# Patient Record
Sex: MALE | Race: WHITE | Employment: FULL TIME | URBAN - METROPOLITAN AREA
[De-identification: names, ages, dates, MRNs, and addresses within clinical notes are randomized per-mention and may not be internally consistent; named-entity substitution may affect disease eponyms.]

---

## 2020-06-14 ENCOUNTER — APPOINTMENT (OUTPATIENT)
Dept: CT IMAGING | Age: 50
End: 2020-06-14
Payer: COMMERCIAL

## 2020-06-14 ENCOUNTER — APPOINTMENT (OUTPATIENT)
Dept: GENERAL RADIOLOGY | Age: 50
End: 2020-06-14
Payer: COMMERCIAL

## 2020-06-14 ENCOUNTER — HOSPITAL ENCOUNTER (EMERGENCY)
Age: 50
Discharge: HOME | End: 2020-06-14
Attending: EMERGENCY MEDICINE
Payer: COMMERCIAL
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
## 2020-06-14 PROBLEM — S02.2XXA NASAL BONE FX-CLOSED: Status: ACTIVE | Noted: 2020-06-14

## 2020-06-14 LAB
ABO/RH: NORMAL
ALLEN TEST: ABNORMAL
ANION GAP SERPL CALCULATED.3IONS-SCNC: 13 MMOL/L (ref 9–17)
ANTIBODY SCREEN: NEGATIVE
ARM BAND NUMBER: NORMAL
BLOOD BANK SPECIMEN: ABNORMAL
BUN BLDV-MCNC: 14 MG/DL (ref 6–20)
CARBOXYHEMOGLOBIN: 0.4 % (ref 0–5)
CHLORIDE BLD-SCNC: 98 MMOL/L (ref 98–107)
CO2: 22 MMOL/L (ref 20–31)
CREAT SERPL-MCNC: 0.87 MG/DL (ref 0.7–1.2)
ETHANOL PERCENT: 0.27 %
ETHANOL: 270 MG/DL
EXPIRATION DATE: NORMAL
FIO2: ABNORMAL
GFR AFRICAN AMERICAN: >60 ML/MIN
GFR NON-AFRICAN AMERICAN: >60 ML/MIN
GFR SERPL CREATININE-BSD FRML MDRD: ABNORMAL ML/MIN/{1.73_M2}
GFR SERPL CREATININE-BSD FRML MDRD: ABNORMAL ML/MIN/{1.73_M2}
GLUCOSE BLD-MCNC: 163 MG/DL (ref 70–99)
HCG QUALITATIVE: ABNORMAL
HCO3 VENOUS: 22.8 MMOL/L (ref 24–30)
HCT VFR BLD CALC: 40.4 % (ref 40.7–50.3)
HEMOGLOBIN: 13.7 G/DL (ref 13–17)
INR BLD: 1
MCH RBC QN AUTO: 31.9 PG (ref 25.2–33.5)
MCHC RBC AUTO-ENTMCNC: 33.9 G/DL (ref 28.4–34.8)
MCV RBC AUTO: 94 FL (ref 82.6–102.9)
METHEMOGLOBIN: ABNORMAL % (ref 0–1.5)
MODE: ABNORMAL
NEGATIVE BASE EXCESS, VEN: 3.8 MMOL/L (ref 0–2)
NOTIFICATION TIME: ABNORMAL
NOTIFICATION: ABNORMAL
NRBC AUTOMATED: 0 PER 100 WBC
O2 DEVICE/FLOW/%: ABNORMAL
O2 SAT, VEN: 68.6 % (ref 60–85)
OXYHEMOGLOBIN: ABNORMAL % (ref 95–98)
PARTIAL THROMBOPLASTIN TIME: 22.8 SEC (ref 20.5–30.5)
PATIENT TEMP: 37
PCO2, VEN, TEMP ADJ: ABNORMAL MMHG (ref 39–55)
PCO2, VEN: 49.9 (ref 39–55)
PDW BLD-RTO: 12.1 % (ref 11.8–14.4)
PEEP/CPAP: ABNORMAL
PH VENOUS: 7.28 (ref 7.32–7.42)
PH, VEN, TEMP ADJ: ABNORMAL (ref 7.32–7.42)
PLATELET # BLD: 368 K/UL (ref 138–453)
PMV BLD AUTO: 9.2 FL (ref 8.1–13.5)
PO2, VEN, TEMP ADJ: ABNORMAL MMHG (ref 30–50)
PO2, VEN: 42.2 (ref 30–50)
POSITIVE BASE EXCESS, VEN: ABNORMAL MMOL/L (ref 0–2)
POTASSIUM SERPL-SCNC: 4.2 MMOL/L (ref 3.7–5.3)
PROTHROMBIN TIME: 10.1 SEC (ref 9–12)
PSV: ABNORMAL
PT. POSITION: ABNORMAL
RBC # BLD: 4.3 M/UL (ref 4.21–5.77)
RESPIRATORY RATE: ABNORMAL
SAMPLE SITE: ABNORMAL
SET RATE: ABNORMAL
SODIUM BLD-SCNC: 133 MMOL/L (ref 135–144)
TEXT FOR RESPIRATORY: ABNORMAL
TOTAL HB: ABNORMAL G/DL (ref 12–16)
TOTAL RATE: ABNORMAL
VT: ABNORMAL
WBC # BLD: 12.6 K/UL (ref 3.5–11.3)

## 2020-06-14 PROCEDURE — 99285 EMERGENCY DEPT VISIT HI MDM: CPT

## 2020-06-14 PROCEDURE — 72128 CT CHEST SPINE W/O DYE: CPT

## 2020-06-14 PROCEDURE — 96374 THER/PROPH/DIAG INJ IV PUSH: CPT

## 2020-06-14 PROCEDURE — 84520 ASSAY OF UREA NITROGEN: CPT

## 2020-06-14 PROCEDURE — 85730 THROMBOPLASTIN TIME PARTIAL: CPT

## 2020-06-14 PROCEDURE — 72131 CT LUMBAR SPINE W/O DYE: CPT

## 2020-06-14 PROCEDURE — G0480 DRUG TEST DEF 1-7 CLASSES: HCPCS

## 2020-06-14 PROCEDURE — 86900 BLOOD TYPING SEROLOGIC ABO: CPT

## 2020-06-14 PROCEDURE — 70450 CT HEAD/BRAIN W/O DYE: CPT

## 2020-06-14 PROCEDURE — 6360000004 HC RX CONTRAST MEDICATION: Performed by: EMERGENCY MEDICINE

## 2020-06-14 PROCEDURE — 6370000000 HC RX 637 (ALT 250 FOR IP): Performed by: EMERGENCY MEDICINE

## 2020-06-14 PROCEDURE — 71045 X-RAY EXAM CHEST 1 VIEW: CPT

## 2020-06-14 PROCEDURE — 80051 ELECTROLYTE PANEL: CPT

## 2020-06-14 PROCEDURE — 82947 ASSAY GLUCOSE BLOOD QUANT: CPT

## 2020-06-14 PROCEDURE — 72125 CT NECK SPINE W/O DYE: CPT

## 2020-06-14 PROCEDURE — 85610 PROTHROMBIN TIME: CPT

## 2020-06-14 PROCEDURE — 82565 ASSAY OF CREATININE: CPT

## 2020-06-14 PROCEDURE — 74177 CT ABD & PELVIS W/CONTRAST: CPT

## 2020-06-14 PROCEDURE — 86850 RBC ANTIBODY SCREEN: CPT

## 2020-06-14 PROCEDURE — 82805 BLOOD GASES W/O2 SATURATION: CPT

## 2020-06-14 PROCEDURE — 84703 CHORIONIC GONADOTROPIN ASSAY: CPT

## 2020-06-14 PROCEDURE — 6360000002 HC RX W HCPCS

## 2020-06-14 PROCEDURE — 86901 BLOOD TYPING SEROLOGIC RH(D): CPT

## 2020-06-14 PROCEDURE — 70486 CT MAXILLOFACIAL W/O DYE: CPT

## 2020-06-14 PROCEDURE — 85027 COMPLETE CBC AUTOMATED: CPT

## 2020-06-14 RX ORDER — ONDANSETRON 4 MG/1
4 TABLET, ORALLY DISINTEGRATING ORAL ONCE
Status: COMPLETED | OUTPATIENT
Start: 2020-06-14 | End: 2020-06-14

## 2020-06-14 RX ORDER — IBUPROFEN 800 MG/1
800 TABLET ORAL EVERY 8 HOURS PRN
Qty: 14 TABLET | Refills: 0 | Status: SHIPPED | OUTPATIENT
Start: 2020-06-14

## 2020-06-14 RX ORDER — AMOXICILLIN AND CLAVULANATE POTASSIUM 875; 125 MG/1; MG/1
1 TABLET, FILM COATED ORAL 2 TIMES DAILY
Qty: 10 TABLET | Refills: 0 | Status: SHIPPED | OUTPATIENT
Start: 2020-06-14 | End: 2020-06-19

## 2020-06-14 RX ORDER — ONDANSETRON 4 MG/1
4 TABLET, ORALLY DISINTEGRATING ORAL EVERY 8 HOURS PRN
Qty: 6 TABLET | Refills: 0 | Status: SHIPPED | OUTPATIENT
Start: 2020-06-14

## 2020-06-14 RX ORDER — ONDANSETRON 2 MG/ML
INJECTION INTRAMUSCULAR; INTRAVENOUS
Status: COMPLETED
Start: 2020-06-14 | End: 2020-06-14

## 2020-06-14 RX ORDER — ONDANSETRON 2 MG/ML
4 INJECTION INTRAMUSCULAR; INTRAVENOUS ONCE
Status: COMPLETED | OUTPATIENT
Start: 2020-06-14 | End: 2020-06-14

## 2020-06-14 RX ADMIN — ONDANSETRON 4 MG: 2 INJECTION, SOLUTION INTRAMUSCULAR; INTRAVENOUS at 06:51

## 2020-06-14 RX ADMIN — ONDANSETRON 4 MG: 2 INJECTION INTRAMUSCULAR; INTRAVENOUS at 06:51

## 2020-06-14 RX ADMIN — IOHEXOL 130 ML: 350 INJECTION, SOLUTION INTRAVENOUS at 04:53

## 2020-06-14 RX ADMIN — ONDANSETRON 4 MG: 4 TABLET, ORALLY DISINTEGRATING ORAL at 07:48

## 2020-06-14 ASSESSMENT — ENCOUNTER SYMPTOMS
VOMITING: 0
COUGH: 0
SHORTNESS OF BREATH: 0
SINUS PAIN: 1
SORE THROAT: 0
NAUSEA: 0
ABDOMINAL PAIN: 0
RHINORRHEA: 0
BACK PAIN: 0

## 2020-06-14 NOTE — ED PROVIDER NOTES
277.  He received tetanus and Ancef. On arrival he is alert and follows commands. Left pupil 1 mm greater than the right. He moves all extremities. Chest and abdomen are benign. Impression is concussive head injury. Plan is CT head and neck. Shaheen Wadsworth.  Christian Allen MD, [facility]  Attending Emergency  Physician                Hank David MD  06/14/20 1526

## 2020-06-14 NOTE — ED PROVIDER NOTES
 Social connections     Talks on phone: Not on file     Gets together: Not on file     Attends Ep[employer] service: Not on file     Active member of club or organization: Not on file     Attends meetings of clubs or organizations: Not on file     Relationship status: Not on file    Intimate partner violence     Fear of current or ex partner: Not on file     Emotionally abused: Not on file     Physically abused: Not on file     Forced sexual activity: Not on file   Other Topics Concern    Not on file   Social History Narrative    Not on file       No family history on file. Allergies:  Patient has no allergy information on record. Home Medications:  Prior to Admission medications    Medication Sig Start Date End Date Taking? Authorizing Provider   ibuprofen (ADVIL;MOTRIN) 800 MG tablet Take 1 tablet by mouth every 8 hours as needed for Pain 6/14/20  Yes Carleenmanigel Simon, DO   ondansetron (ZOFRAN ODT) 4 MG disintegrating tablet Take 1 tablet by mouth every 8 hours as needed for Nausea 6/14/20  Yes Ahmaricarmen Simon, DO   amoxicillin-clavulanate (AUGMENTIN) 875-125 MG per tablet Take 1 tablet by mouth 2 times daily for 5 days 6/14/20 6/19/20 Yes Ahmad Simon, DO       REVIEW OF SYSTEMS    (2-9 systems for level 4, 10 or more for level 5)      Review of Systems   Constitutional: Negative for chills and fever. HENT: Negative for congestion and rhinorrhea. Respiratory: Negative for cough and shortness of breath. Cardiovascular: Negative for chest pain. Gastrointestinal: Negative for abdominal pain, nausea and vomiting. Genitourinary: Negative for decreased urine volume and urgency. Musculoskeletal: Negative for back pain and gait problem. Skin: Positive for wound. Negative for rash. Neurological: Negative for light-headedness and headaches. PHYSICAL EXAM   (up to 7 for level 4, 8 or more for level 5)      INITIAL VITALS:   There were no vitals taken for this visit.     Physical Exam  Vitals signs and nursing note reviewed. Constitutional:       Comments: Alert, oriented, cooperative with exam, no acute distress   HENT:      Head: Normocephalic. Comments: Abrasions to bridge of nose, dried blood in bilateral nares, no septal hematoma     Ears:      Comments: No hemotympanum bilaterally  Eyes:      Extraocular Movements: Extraocular movements intact. Conjunctiva/sclera: Conjunctivae normal.      Pupils: Pupils are equal, round, and reactive to light. Neck:      Musculoskeletal: No muscular tenderness. Comments: Patient in c-collar, no midline tenderness  Cardiovascular:      Rate and Rhythm: Normal rate. Pulmonary:      Effort: Pulmonary effort is normal.      Breath sounds: Normal breath sounds. Comments: Equal breath sounds bilaterally  Abdominal:      General: Abdomen is flat. There is no distension. Musculoskeletal: Normal range of motion. General: No swelling or tenderness. Lymphadenopathy:      Cervical: No cervical adenopathy. Skin:     General: Skin is warm. Capillary Refill: Capillary refill takes less than 2 seconds. Neurological:      General: No focal deficit present. Mental Status: He is oriented to person, place, and time.          DIFFERENTIAL  DIAGNOSIS     PLAN (LABS / IMAGING / EKG):  Orders Placed This Encounter   Procedures    XR CHEST PORTABLE    CT CHEST ABDOMEN PELVIS W CONTRAST    CT Head WO Contrast    CT Cervical Spine WO Contrast    CT Thoracic Spine WO Contrast    CT Lumbar Spine WO Contrast    CT FACIAL BONES WO CONTRAST    Trauma Panel    Urine Drug Screen    Urinalysis    Type and Screen       MEDICATIONS ORDERED:  Orders Placed This Encounter   Medications    ondansetron (ZOFRAN) 4 MG/2ML injection     MARCOS BE: cabinet override    iohexol (OMNIPAQUE 350) solution 130 mL    ondansetron (ZOFRAN-ODT) disintegrating tablet 4 mg    ondansetron (ZOFRAN) injection 4 mg    ibuprofen (ADVIL;MOTRIN) 800 MG 0.4 0 - 5 %    Methemoglobin NOT REPORTED 0.0 - 1.5 %    Pt Temp 37.0     pH, Hipolito, Temp Adj NOT REPORTED 7.320 - 7.420    pCO2, Hipolito, Temp Adj NOT REPORTED 39 - 55 mmHg    pO2, Hipolito, Temp Adj NOT REPORTED 30 - 50 mmHg    O2 Device/Flow/% NOT REPORTED     Respiratory Rate NOT REPORTED     Miko Test NOT REPORTED     Sample Site NOT REPORTED     Pt. Position NOT REPORTED     Mode NOT REPORTED     Set Rate NOT REPORTED     Total Rate NOT REPORTED     VT NOT REPORTED     FIO2 UNKNOWN     Peep/Cpap NOT REPORTED     PSV NOT REPORTED     Text for Respiratory NOT REPORTED     NOTIFICATION NOT REPORTED     NOTIFICATION TIME NOT REPORTED    TYPE AND SCREEN   Result Value Ref Range    Expiration Date 06/17/2020,2359     Arm Band Number [id number]     ABO/Rh O POSITIVE     Antibody Screen NEGATIVE          MDM/EMERGENCY DEPARTMENT COURSE:    51-year-old male presenting as a transfer from outside facility due to fall while intoxicated, was perseverating at outside facility, concern for intracranial pathology, arrives alert and oriented, airway intact, equal breath sounds bilaterally. I was the airway physician, trauma team present on patient's arrival into the emergency department. Patient did not require any airway intervention during emergency department stay. Patient found to have bilateral nasal bone fractures, no other injuries. Will discharge the patient when clinically sober or has a sober ride with sinus precautions. ED Course as of Jun 14 2008   Sun Jun 14, 2020   0630 Daughter coming to  patient in approximately 2 hours    [BJ]   0716 C-collar cleared by trauma. Ok to discharge from trauma standpoint. Patient has a sober ride here (Dad) who has agreed to take responsibility for the patient as he is still intoxicated. Will discharge at this time with sinus precautions and OMF follow up. Patient agrees with plan.  I explained that although the ED work-up performed today is reassuring, it is always possible for things to worsen or change while at home - as such, it is important to return to the ED immediately for re-evaluation if new symptoms develop. Additional verbal and written discharge instructions and return precautions were given. All questions were answered prior to discharge. [BJ]   [id number] Patient is ambulatory with a steady and equal gait. [BJ]      ED Course User Index  [BJ] Tony Lawrence DO         PROCEDURES:  None    CONSULTS:  None    CRITICAL CARE:  None    FINAL IMPRESSION      1.  Acute alcoholic intoxication with complication ([address]    2. Closed fracture of nasal bone, initial encounter          DISPOSITION / PLAN     DISPOSITION Decision To Discharge    PATIENT REFERRED TO:  [address] [facility] [address]  [address] [id number]  [phone]  Schedule an appointment as soon as possible for a visit   to follow up with trauma clinic    Gomze Florentino DDS  [address] [facility] [address] [facility]  [phone]    Schedule an appointment as soon as possible for a visit         DISCHARGE MEDICATIONS:  Discharge Medication List as of 6/14/2020  7:25 AM      START taking these medications    Details   ibuprofen (ADVIL;MOTRIN) 800 MG tablet Take 1 tablet by mouth every 8 hours as needed for Pain, Disp-14 tablet, R-0Print      ondansetron (ZOFRAN ODT) 4 MG disintegrating tablet Take 1 tablet by mouth every 8 hours as needed for Nausea, Disp-6 tablet, R-0Print      amoxicillin-clavulanate (AUGMENTIN) 875-125 MG per tablet Take 1 tablet by mouth 2 times daily for 5 days, Disp-10 tablet, R-0Print             Vandana Rodriguez MD  Emergency Medicine Resident    (Please note that portions of this note were completed with a voicerecognition program.  Efforts were made to edit the dictations but occasionally words are mis-transcribed.)        Vandana Rodriguez MD  06/14/20 Raúl Ross MD  06/14/20 2008

## 2020-06-14 NOTE — FLOWSHEET NOTE
[facility] [address]     Emergency/Trauma Note    PATIENT NAME: Tanya Sapp    Shift date: 6/14/2020  Shift day: Saturday   Shift # 3    Room # 20/20   Name: Tanya Sapp            Age: 54 y.o. Gender: male          [address] of [place]: [place]    Trauma/Incident type: Adult Trauma Priority  Admit Date & Time: 6/14/2020  4:02 AM  TRAUMA NAME:         PATIENT/EVENT DESCRIPTION:  Tanya Sapp is a 54 y.o. male who arrived via [facility] from [facility] as a Trauma Priority. Patient fell down stairs, face down. Patient presents with facial lacerations. CT of head and neck being performed. Pt to be admitted to 20/20. SPIRITUAL ASSESSMENT/INTERVENTION:   responded to page of Trauma Priority.  went to Trauma C.  was ministry of presence.  at bedside. Patient stated he is [place] and attends [employer]. Patient stated his family is at the [facility] in [facility]. Patient very anxious and fearful.  prayed with patient. PATIENT BELONGINGS:  With patient    ANY BELONGINGS OF SIGNIFICANT VALUE NOTED:  None Noted    REGISTRATION STAFF NOTIFIED? WHAT IS YOUR SPIRITUAL CARE PLAN FOR THIS PATIENT?:   Chaplains will remain available for spiritual and emotional support. Electronically signed by [facility] Resident      06/14/20 5579   Encounter Summary   Services provided to: Patient   Referral/Consult From: Multi-disciplinary team   Support System Family members; Spiritism/gavin community   Place of Jehovah's witness   ([place])   Continue Visiting   (6/14/2020)   Complexity of Encounter High   Length of Encounter 1 hour   Spiritual Assessment Completed Yes   Crisis   Type Trauma   Assessment Grieving; Anxious; Fearful   Intervention Active listening;Prayer;Sustaining presence/ Ministry of presence   Outcome Expressed gratitude   , on 6/14/2020 at 5:10 AM.  [address] [facility] [address]  [address] [phone]

## 2020-06-14 NOTE — ED PROVIDER NOTES
reduce the radiation dose to as low as reasonably achievable.; CT of the face was performed without the administration of intravenous contrast. Multiplanar reformatted images are provided for review. Dose modulation, iterative reconstruction, and/or weight based adjustment of the mA/kV was utilized to reduce the radiation dose to as low as reasonably achievable.; CT of the cervical spine was performed without the administration of intravenous contrast. Multiplanar reformatted images are provided for review. Dose modulation, iterative reconstruction, and/or weight based adjustment of the mA/kV was utilized to reduce the radiation dose to as low as reasonably achievable. COMPARISON: None. HISTORY: ORDERING SYSTEM PROVIDED HISTORY: Fall TECHNOLOGIST PROVIDED HISTORY: Fall Reason for Exam: trauma Acuity: Unknown Type of Exam: Unknown Mechanism of Injury: fall; ORDERING SYSTEM PROVIDED HISTORY: fall, nose lac TECHNOLOGIST PROVIDED HISTORY: fall, nose lac Reason for Exam: trauma Acuity: Unknown Type of Exam: Unknown Mechanism of Injury: fall; ORDERING SYSTEM PROVIDED HISTORY: Fall TECHNOLOGIST PROVIDED HISTORY: Fall Reason for Exam: trauma Acuity: Unknown Type of Exam: Unknown Mechanism of Injury: fall FINDINGS: CT CERVICAL SPINE: BONES/ALIGNMENT: There is no acute fracture or traumatic malalignment. DEGENERATIVE CHANGES: Multilevel degenerative changes in the cervical spine. SOFT TISSUES: There is no prevertebral soft tissue swelling. CT HEAD: BRAIN/VENTRICLES: There is no acute intracranial hemorrhage, mass effect or midline shift. No abnormal extra-axial fluid collection. The gray-white differentiation is maintained without evidence of an acute infarct. There is no evidence of hydrocephalus. SOFT TISSUES/SKULL: No acute abnormality of the visualized skull or soft tissues. CT FACIAL BONES: FACIAL BONES: Acute nondisplaced bilateral nasal bone fractures. The maxilla, pterygoid plates and zygomatic arches are intact. The mandible is intact. The mandibular condyles are normally situated. ORBITS: The globes appear intact. The extraocular muscles, optic nerve sheath complexes and lacrimal glands appear unremarkable. No retrobulbar hematoma or mass is seen. The orbital walls and rims are intact. SINUSES/MASTOIDS: Mild bilateral maxillary and ethmoid sinus mucosal thickening. Small amount of fluid/hemorrhage in the right maxillary sinus. The bilateral mastoid air cells are clear. No acute fracture is seen. SOFT TISSUES: Paranasal soft tissue swelling and laceration. 1.  No acute cervical spine fracture. 2.  No acute intracranial abnormality. 3.  Acute nondisplaced bilateral nasal bone fractures. Ct Thoracic Spine Wo Contrast    Result Date: 6/14/2020  EXAMINATION: CT OF THE CHEST, ABDOMEN, AND PELVIS WITH CONTRAST; CT OF THE THORACIC SPINE WITHOUT CONTRAST; CT OF THE LUMBAR SPINE WITHOUT CONTRAST 6/14/2020 TECHNIQUE: CT of the chest, abdomen and pelvis was performed with the administration of intravenous contrast. Multiplanar reformatted images are provided for review. Dose modulation, iterative reconstruction, and/or weight based adjustment of the mA/kV was utilized to reduce the radiation dose to as low as reasonably achievable.; CT of the thoracic spine was performed without the administration of intravenous contrast. Multiplanar reformatted images are provided for review. Dose modulation, iterative reconstruction, and/or weight based adjustment of the mA/kV was utilized to reduce the radiation dose to as low as reasonably achievable.; CT of the lumbar spine was performed without the administration of intravenous contrast. Multiplanar reformatted images are provided for review. Dose modulation, iterative reconstruction, and/or weight based adjustment of the mA/kV was utilized to reduce the radiation dose to as low as reasonably achievable. COMPARISON: None.  HISTORY: ORDERING SYSTEM PROVIDED HISTORY: Fall TECHNOLOGIST ABDOMEN, AND PELVIS WITH CONTRAST; CT OF THE THORACIC SPINE WITHOUT CONTRAST; CT OF THE LUMBAR SPINE WITHOUT CONTRAST 6/14/2020 TECHNIQUE: CT of the chest, abdomen and pelvis was performed with the administration of intravenous contrast. Multiplanar reformatted images are provided for review. Dose modulation, iterative reconstruction, and/or weight based adjustment of the mA/kV was utilized to reduce the radiation dose to as low as reasonably achievable.; CT of the thoracic spine was performed without the administration of intravenous contrast. Multiplanar reformatted images are provided for review. Dose modulation, iterative reconstruction, and/or weight based adjustment of the mA/kV was utilized to reduce the radiation dose to as low as reasonably achievable.; CT of the lumbar spine was performed without the administration of intravenous contrast. Multiplanar reformatted images are provided for review. Dose modulation, iterative reconstruction, and/or weight based adjustment of the mA/kV was utilized to reduce the radiation dose to as low as reasonably achievable. COMPARISON: None. HISTORY: ORDERING SYSTEM PROVIDED HISTORY: Fall TECHNOLOGIST PROVIDED HISTORY: Fall Reason for Exam: trauma Acuity: Unknown Type of Exam: Unknown Mechanism of Injury: fall; ORDERING SYSTEM PROVIDED HISTORY: Fall TECHNOLOGIST PROVIDED HISTORY: Fall Reason for Exam: trauma Acuity: Unknown Type of Exam: Unknown Mechanism of Injury: fall FINDINGS: Chest: Mediastinum: Normal heart and pericardium. Mild atherosclerosis of the tortuous thoracic aorta. No thoracic aortic aneurysm. No mediastinal hemorrhage or pneumomediastinum. Visualized thyroid gland is normal.  No intrathoracic lymphadenopathy. Large hiatal hernia with a portion of intrathoracic stomach. Lungs/pleura: Respiratory motion. Expiratory imaging. Low lung volume. Bilateral dependent and basilar subsegmental atelectasis. No evidence of acute lung injury.   No pulmonary mass or consolidation. No pleural effusion or pneumothorax. Soft Tissues/Bones: No acute abnormality. Abdomen/Pelvis: Liver: Normal. Gallbladder and Bile Ducts: Cholelithiasis. No pericholecystic fluid. Spleen: Normal. Adrenal Glands: Normal. Pancreas: Normal. Genitourinary: Normal. Bowel: Normal caliber bowel. Normal appendix. Colonic diverticulosis without acute diverticulitis. Vasculature: Atherosclerosis. No abdominal aortic aneurysm. Patent portal vein. No periaortic hemorrhage. Bones and Soft Tissues: No acute abnormality. Retroperitoneum/Mesentery: No intraperitoneal free air, ascites or fluid collection. No lymphadenopathy in the abdomen or pelvis. Thoracic and Lumbar Spine: BONES/ALIGNMENT: There is no acute fracture or traumatic malalignment. DEGENERATIVE CHANGES: Mild multilevel degenerative changes in the thoracic and lumbar spine. SOFT TISSUES: No paraspinal mass is seen. 1.  No CT evidence of acute traumatic injury in the chest, abdomen or pelvis. 2.  No acute osseous abnormality in the thoracic or lumbar spine. 3.  Large hiatal hernia. Xr Chest Portable    Result Date: 6/14/2020  EXAMINATION: ONE XRAY VIEW OF THE CHEST 6/14/2020 4:25 am COMPARISON: None. HISTORY: ORDERING SYSTEM PROVIDED HISTORY: Fall TECHNOLOGIST PROVIDED HISTORY: Fall Reason for Exam: portable supine/ trauma/ fall Acuity: Acute Type of Exam: Initial FINDINGS: Frontal portable view of the chest.  Low right lung volume with elevation of the right hemidiaphragm. No focal airspace disease. No pleural effusion or pneumothorax. Large hiatal hernia. No significant cardiomegaly. No acute osseous abnormality. No focal airspace disease. Large hiatal hernia.      Ct Chest Abdomen Pelvis W Contrast    Result Date: 6/14/2020  EXAMINATION: CT OF THE CHEST, ABDOMEN, AND PELVIS WITH CONTRAST; CT OF THE THORACIC SPINE WITHOUT CONTRAST; CT OF THE LUMBAR SPINE WITHOUT CONTRAST 6/14/2020 TECHNIQUE: CT of the chest, abdomen and pelvis was performed with the administration of intravenous contrast. Multiplanar reformatted images are provided for review. Dose modulation, iterative reconstruction, and/or weight based adjustment of the mA/kV was utilized to reduce the radiation dose to as low as reasonably achievable.; CT of the thoracic spine was performed without the administration of intravenous contrast. Multiplanar reformatted images are provided for review. Dose modulation, iterative reconstruction, and/or weight based adjustment of the mA/kV was utilized to reduce the radiation dose to as low as reasonably achievable.; CT of the lumbar spine was performed without the administration of intravenous contrast. Multiplanar reformatted images are provided for review. Dose modulation, iterative reconstruction, and/or weight based adjustment of the mA/kV was utilized to reduce the radiation dose to as low as reasonably achievable. COMPARISON: None. HISTORY: ORDERING SYSTEM PROVIDED HISTORY: Fall TECHNOLOGIST PROVIDED HISTORY: Fall Reason for Exam: trauma Acuity: Unknown Type of Exam: Unknown Mechanism of Injury: fall; ORDERING SYSTEM PROVIDED HISTORY: Fall TECHNOLOGIST PROVIDED HISTORY: Fall Reason for Exam: trauma Acuity: Unknown Type of Exam: Unknown Mechanism of Injury: fall FINDINGS: Chest: Mediastinum: Normal heart and pericardium. Mild atherosclerosis of the tortuous thoracic aorta. No thoracic aortic aneurysm. No mediastinal hemorrhage or pneumomediastinum. Visualized thyroid gland is normal.  No intrathoracic lymphadenopathy. Large hiatal hernia with a portion of intrathoracic stomach. Lungs/pleura: Respiratory motion. Expiratory imaging. Low lung volume. Bilateral dependent and basilar subsegmental atelectasis. No evidence of acute lung injury. No pulmonary mass or consolidation. No pleural effusion or pneumothorax. Soft Tissues/Bones: No acute abnormality. Abdomen/Pelvis: Liver: Normal. Gallbladder and Bile Ducts: Cholelithiasis. No pericholecystic fluid. Spleen: Normal. Adrenal Glands: Normal. Pancreas: Normal. Genitourinary: Normal. Bowel: Normal caliber bowel. Normal appendix. Colonic diverticulosis without acute diverticulitis. Vasculature: Atherosclerosis. No abdominal aortic aneurysm. Patent portal vein. No periaortic hemorrhage. Bones and Soft Tissues: No acute abnormality. Retroperitoneum/Mesentery: No intraperitoneal free air, ascites or fluid collection. No lymphadenopathy in the abdomen or pelvis. Thoracic and Lumbar Spine: BONES/ALIGNMENT: There is no acute fracture or traumatic malalignment. DEGENERATIVE CHANGES: Mild multilevel degenerative changes in the thoracic and lumbar spine. SOFT TISSUES: No paraspinal mass is seen. 1.  No CT evidence of acute traumatic injury in the chest, abdomen or pelvis. 2.  No acute osseous abnormality in the thoracic or lumbar spine. 3.  Large hiatal hernia. RECENT VITALS:      ,   ,  ,  ,      This patient is a 54 y.o. Male with [facility] from [address] [place] [address]. Fell down steps. GCS 15 on arrival. Pan scan; b/l nasal bone fx. Sober 11am.       ED Course as of Jun 14 0744   Sun Jun 14, 2020   0630 Daughter coming to  patient in approximately 2 hours    [BJ]   0716 C-collar cleared by trauma. Ok to discharge from trauma standpoint. Patient has a sober ride here (Dad) who has agreed to take responsibility for the patient as he is still intoxicated. Will discharge at this time with sinus precautions and OMF follow up. Patient agrees with plan. I explained that although the ED work-up performed today is reassuring, it is always possible for things to worsen or change while at home - as such, it is important to return to the ED immediately for re-evaluation if new symptoms develop. Additional verbal and written discharge instructions and return precautions were given. All questions were answered prior to discharge. [BJ]   [id number] Patient is ambulatory with a steady and equal gait.

## 2020-06-14 NOTE — H&P
TRAUMA HISTORY AND PHYSICAL EXAMINATION    PATIENT NAME: Molly Barker  YOB: 1970  MEDICAL RECORD NO. [id number]   DATE: 6/14/2020  PRIMARY CARE PHYSICIAN: Marian Kearney MD  PATIENT EVALUATED AT THE REQUEST OF : Adrien    ACTIVATION   []Trauma Alert     [x] Trauma Priority     []Trauma Consult. IMPRESSION:     Patient Active Problem List   Diagnosis    Fall    Nasal bone fx-closed       MEDICAL DECISION MAKING AND PLAN:       Fall down steps, + LOC, +EtOh; B/L nasal bone Fx  -imaging negative other than nasal bone Fx  -clear C-spine once sober  -may d/c from ED after    [address]    [] Neurosurgery     [] Orthopedic Surgery    [] Cardiothoracic     [] Facial Trauma    [] Plastic Surgery (Burn)    [] Pediatric Surgery     [] Internal Medicine    [] Pulmonary Medicine    [] Other:        HISTORY:     Chief Complaint:  \"Fall down stairs\"    INJURY SUMMARY  B/L nasal bone Fx    GENERAL DATA  Age 54 y.o.  male   Patient information was obtained from EMS personnel. History/Exam limitations: intoxication. Patient presented to the Emergency Department by ambulance where the patient received no meds prior to arrival.  Injury Date: 6/14/2020   Approximate Injury Time: 12am        Transport mode:   []Ambulance      [x] Helicopter     []Car       [] Other  Referring Hospital: [address] [place] [address], (e.g., home, farm, industry, street)  Specific Details of Location (e.g., bedroom, kitchen, garage): bar  Type of Residence (if occurred in home setting) (e.g., apartment, mobile home, single family home): MECHANISM OF INJURY  [x] Fall    []From Standing     []From Height  Ft     [x]Down Stairs ___steps  HISTORY:     Molly Barker is a 54 y.o. male that presented to the Emergency Department following a fall down a unknown number of stairs. EMS reports patient left the bar and fell down an unknown number of stairs, +LOC.   Pt was transferred to [facility] where he was PHYSICAL EXAMINATION:     GLASCOW COMA SCALE  NEUROMUSCULAR BLOCKADE PRIOR TO ARRIVAL     [x]No        []Yes      Variable  Score   Variable  Score  Eye opening [x]Spontaneous 4 Verbal  []Oriented  5     []To voice  3   [x]Confused  4    []To pain  2   []Inapp words  3    []None  1   []Incomp words 2       []None  1   Motor   [x]Obeys  6    []Localizes pain 5    []Withdraws(pain) 4    []Flexion(pain) 3  []Extension(pain) 2    []None  1     GCS Total = 14    PHYSICAL EXAMINATION    VITAL SIGNS: There were no vitals filed for this visit. Physical Exam  Vitals signs reviewed. Constitutional:       General: He is not in acute distress. Appearance: Normal appearance. He is well-developed. HENT:      Head: Normocephalic and atraumatic. Eyes:      Pupils: Pupils are equal, round, and reactive to light. Neck:      Musculoskeletal: Normal range of motion and neck supple. Cardiovascular:      Rate and Rhythm: Normal rate and regular rhythm. Pulmonary:      Effort: Pulmonary effort is normal.      Breath sounds: Normal breath sounds. Abdominal:      General: Bowel sounds are normal. There is no distension. Palpations: Abdomen is soft. Tenderness: There is no abdominal tenderness. Musculoskeletal: Normal range of motion. Skin:     General: Skin is warm and dry. Capillary Refill: Capillary refill takes less than 2 seconds. Comments: Small ant. Nasal abrasion   Neurological:      General: No focal deficit present. Mental Status: He is alert and oriented to person, place, and time. FOCUSED ABDOMINAL SONOGRAM FOR TRAUMA (FAST): A good  quality examination was performed by Dr. Loni Leyva and representative images were obtained. [x] No free fluid in the abdomen   [] Free fluid in RUQ   [] Free fluid in LUQ  [] Free fluid in Pelvis  [] Pericardial fluid  [] Other:        RADIOLOGY  CT FACIAL BONES WO CONTRAST   Final Result   1. No acute cervical spine fracture.       2.
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
